# Patient Record
Sex: MALE | Race: WHITE | NOT HISPANIC OR LATINO | URBAN - METROPOLITAN AREA
[De-identification: names, ages, dates, MRNs, and addresses within clinical notes are randomized per-mention and may not be internally consistent; named-entity substitution may affect disease eponyms.]

---

## 2021-08-16 ENCOUNTER — OFFICE (OUTPATIENT)
Dept: URBAN - METROPOLITAN AREA CLINIC 102 | Facility: CLINIC | Age: 71
End: 2021-08-16
Payer: COMMERCIAL

## 2021-08-16 VITALS
TEMPERATURE: 97.5 F | SYSTOLIC BLOOD PRESSURE: 132 MMHG | DIASTOLIC BLOOD PRESSURE: 51 MMHG | HEIGHT: 69 IN | HEART RATE: 48 BPM | WEIGHT: 145 LBS

## 2021-08-16 DIAGNOSIS — R63.4 ABNORMAL WEIGHT LOSS: ICD-10-CM

## 2021-08-16 DIAGNOSIS — Z86.010 PERSONAL HISTORY OF COLONIC POLYPS: ICD-10-CM

## 2021-08-16 DIAGNOSIS — K21.9 GASTRO-ESOPHAGEAL REFLUX DISEASE WITHOUT ESOPHAGITIS: ICD-10-CM

## 2021-08-16 DIAGNOSIS — R63.0 ANOREXIA: ICD-10-CM

## 2021-08-16 PROCEDURE — 99204 OFFICE O/P NEW MOD 45 MIN: CPT | Performed by: PHYSICIAN ASSISTANT

## 2021-08-16 NOTE — SERVICEHPINOTES
Mr. Hernandez is a 71 yr old male here to discuss weight loss. Last colonoscopy 11/2015 showed TAs given a 5 yr recall. Weight loss has been gradual. He was 190 lbs a few yrs ago. He continues to gradually lose weight. He doesn't eat a lot--this too a gradual change. + early satiety. No abdominal pain, N/V. He takes Omeprazole and has been on this for years--and this controls the reflux at times. At times, due to dietary indiscretions, he needs tums too, on average 2 times per week. He denies constipation or diarrhea--takes Miralax as needed which works well for him. No blood in the stool or black stool.   BRCBC and CMP are normal. He takes two baby ASA per day. Takes rare additional NSAIDS. No known heart issues. No known family hx of GI issues.

## 2021-10-01 ENCOUNTER — ON CAMPUS - OUTPATIENT (OUTPATIENT)
Dept: URBAN - METROPOLITAN AREA HOSPITAL 37 | Facility: HOSPITAL | Age: 71
End: 2021-10-01
Payer: COMMERCIAL

## 2021-10-01 DIAGNOSIS — D12.3 BENIGN NEOPLASM OF TRANSVERSE COLON: ICD-10-CM

## 2021-10-01 DIAGNOSIS — Z86.010 PERSONAL HISTORY OF COLONIC POLYPS: ICD-10-CM

## 2021-10-01 PROCEDURE — 45385 COLONOSCOPY W/LESION REMOVAL: CPT | Performed by: INTERNAL MEDICINE
